# Patient Record
Sex: FEMALE | Race: WHITE
[De-identification: names, ages, dates, MRNs, and addresses within clinical notes are randomized per-mention and may not be internally consistent; named-entity substitution may affect disease eponyms.]

---

## 2017-10-24 ENCOUNTER — HOSPITAL ENCOUNTER (EMERGENCY)
Dept: HOSPITAL 17 - NEPD | Age: 37
Discharge: HOME | End: 2017-10-24
Payer: COMMERCIAL

## 2017-10-24 VITALS
OXYGEN SATURATION: 98 % | HEART RATE: 92 BPM | RESPIRATION RATE: 15 BRPM | SYSTOLIC BLOOD PRESSURE: 163 MMHG | TEMPERATURE: 98.2 F | DIASTOLIC BLOOD PRESSURE: 82 MMHG

## 2017-10-24 DIAGNOSIS — N39.0: ICD-10-CM

## 2017-10-24 DIAGNOSIS — Z79.899: ICD-10-CM

## 2017-10-24 DIAGNOSIS — B96.89: ICD-10-CM

## 2017-10-24 DIAGNOSIS — N76.0: Primary | ICD-10-CM

## 2017-10-24 DIAGNOSIS — Z88.8: ICD-10-CM

## 2017-10-24 DIAGNOSIS — Z88.6: ICD-10-CM

## 2017-10-24 LAB
BACTERIA #/AREA URNS HPF: (no result) /HPF
CHLAMYDIA PCR: NOT DETECTED
COLOR UR: (no result)
COMMENT (UR): (no result)
CULTURE IF INDICATED: (no result)
GLUCOSE UR STRIP-MCNC: (no result) MG/DL
HGB UR QL STRIP: (no result)
KETONES UR STRIP-MCNC: (no result) MG/DL
NEISSERIA PCR: NOT DETECTED
NITRITE UR QL STRIP: (no result)
SP GR UR STRIP: 1.01 (ref 1–1.03)
SQUAMOUS #/AREA URNS HPF: 6 /HPF (ref 0–5)

## 2017-10-24 PROCEDURE — 99284 EMERGENCY DEPT VISIT MOD MDM: CPT

## 2017-10-24 PROCEDURE — 96372 THER/PROPH/DIAG INJ SC/IM: CPT

## 2017-10-24 PROCEDURE — 81001 URINALYSIS AUTO W/SCOPE: CPT

## 2017-10-24 PROCEDURE — 84703 CHORIONIC GONADOTROPIN ASSAY: CPT

## 2017-10-24 PROCEDURE — 87086 URINE CULTURE/COLONY COUNT: CPT

## 2017-10-24 PROCEDURE — 87591 N.GONORRHOEAE DNA AMP PROB: CPT

## 2017-10-24 PROCEDURE — 87491 CHLMYD TRACH DNA AMP PROBE: CPT

## 2017-10-24 NOTE — PD
Physical Exam


Date Seen by Provider:  Oct 24, 2017


Time Seen by Provider:  12:12


Narrative


37-year-old  female with history of vaginal discharge and dysuria for 

the past several days.  She has lower abdominal discomfort which she describes 

as an 8 out of 10.  She denies fever or flank pain.  Last menstrual period was 

3 weeks ago.  She is allergic to aspirin, naproxen, and shellfish.





Data


Data


Last Documented VS





Vital Signs








  Date Time  Temp Pulse Resp B/P (MAP) Pulse Ox O2 Delivery O2 Flow Rate FiO2


 


10/24/17 11:57 98.2 92 15 163/82 (109) 98   











MDM


Medical Record Reviewed:  Yes


Supervised Visit with KETAN:  Yes


Narrative Course


Vitals are stable.





Urinalysis and urine pregnancy and GC chlamydia ordered.





Patient awaiting bed placement.


Condition:  Stable











Angel Morgan Oct 24, 2017 12:14

## 2017-10-24 NOTE — PD
HPI


Chief Complaint:  Gyn Problem/Complaint


Time Seen by Provider:  13:49


Travel History


International Travel<30 days:  No


Contact w/Intl Traveler<30days:  No


Traveled to known affect area:  No





History of Present Illness


HPI


36 yo F c/o vaginal discharge, white with odor. Dysuria reported. Pt reports hx 

of + chlaymdia testing and was advised to come for treatment.  Duration of vd 

approx 2-3 days. No fever/chills. No vomiting, No suresh pain. One partner of many 

years. Partner tested negative.





PFSH


Social History


Alcohol Use:  No


Tobacco Use:  No





Allergies-Medications


(Allergen,Severity, Reaction):  


Coded Allergies:  


     aspirin (Unverified  Allergy, Severe, 8/15/17)


     naproxen (Unverified  Allergy, Severe, 8/15/17)


     shellfish derived (Unverified  Allergy, Severe, 8/15/17)


Uncoded Allergies:  


     ASA, NAPROXEN (Allergy, Unknown, 9/9/03)


Reported Meds & Prescriptions





Reported Meds & Active Scripts


Active


Bactrim DS (Sulfamethoxazole-Trimethoprim) 800-160 Mg Tab 1 Tab PO BID


Metronidazole 500 Mg Tab 500 Mg PO BID 7 Days


Nitrofurantoin Macrocrystal 100 Mg Cap 100 Mg PO BID


Clindesse Vaginal Cream (Clindamycin Vaginal Cream) 2% Cream 1 Appl VAGINAL ONCE


Acyclovir 400 Mg Tab 400 Mg PO BID








Review of Systems


Except as stated in HPI:  all other systems reviewed are Neg





Physical Exam


Narrative


GENERAL: 36 yo F, pleasant, wnwd


PELVIC: Deferred per patient preference.


SKIN: Warm and dry.


HEAD: Atraumatic. Normocephalic. 


EYES: Pupils equal and round. No scleral icterus. No injection or drainage. 


ENT: No nasal bleeding or discharge.  Mucous membranes pink and moist.


NECK: Trachea midline. No JVD. 


CARDIOVASCULAR: Regular rate and rhythm.  


RESPIRATORY: No accessory muscle use. Clear to auscultation. Breath sounds 

equal bilaterally. 


GASTROINTESTINAL: Abdomen soft, non-tender, nondistended. Hepatic and splenic 

margins not palpable. 


MUSCULOSKELETAL: Extremities without clubbing, cyanosis, or edema. No obvious 

deformities. 


NEUROLOGICAL: Awake and alert. No obvious cranial nerve deficits.  Motor 

grossly within normal limits. Five out of 5 muscle strength in the arms and 

legs.  Normal speech.


PSYCHIATRIC: Appropriate mood and affect; insight and judgment normal.





Data


Data


Last Documented VS





Vital Signs








  Date Time  Temp Pulse Resp B/P (MAP) Pulse Ox O2 Delivery O2 Flow Rate FiO2


 


10/24/17 11:57 98.2 92 15 163/82 (109) 98   





VS reviewed


Orders





 Orders


Gc And Chlamydia Pcr (10/24/17 12:14)


Urinalysis - C+S If Indicated (10/24/17 12:14)


Ed Urine Pregnancytest Poc (10/24/17 12:14)


Urine Culture (10/24/17 12:30)


Azithromycin Powd Pack (Zithromax Powd P (10/24/17 14:00)


Ceftriaxone Inj (Rocephin Inj) (10/24/17 14:00)


Lidocaine 1% Inj (50 Ml) (Xylocaine 1% I (10/24/17 14:00)


Ed Discharge Order (10/24/17 14:01)





Labs





Laboratory Tests








Test


  10/24/17


12:30


 


Urine Color LIGHT-YELLOW 


 


Urine Turbidity HAZY 


 


Urine pH 6.5 


 


Urine Specific Gravity 1.013 


 


Urine Protein NEG mg/dL 


 


Urine Glucose (UA) NEG mg/dL 


 


Urine Ketones NEG mg/dL 


 


Urine Occult Blood NEG 


 


Urine Nitrite NEG 


 


Urine Bilirubin NEG 


 


Urine Urobilinogen


  LESS THAN 2.0


MG/DL


 


Urine Leukocyte Esterase LARGE 


 


Urine RBC 3 /hpf 


 


Urine WBC 26 /hpf 


 


Urine Squamous Epithelial


Cells 6 /hpf 


 


 


Urine Bacteria OCC /hpf 


 


Microscopic Urinalysis Comment


  CULTURE


INDICATED


 


Chlamydia trachomatis DNA


(PCR) NOT DETECTED 


 


 


Neisseria gonorrhoeae DNA


(PCR) NOT DETECTED 


 











MDM


Medical Decision Making


Medical Screen Exam Complete:  Yes


Emergency Medical Condition:  Yes


Medical Record Reviewed:  Yes


Differential Diagnosis


chlamydia, uti, bv


Narrative Course


poc pregnanacy is negative


pt will be treated empirically for stds and bv


ua reveals a uti





Diagnosis





 Primary Impression:  


 BV (bacterial vaginosis)


 Additional Impression:  


 UTI (urinary tract infection)


 Qualified Codes:  N30.01 - Acute cystitis with hematuria


Referrals:  


Primary Care Physician


2 days





***Additional Instructions:  


You have a choice when it comes to health care, and we are glad that you chose 

[facility]. Hopefully, we have met your expectations on today's visit.  You 

are welcome to return to [facility] at any time, as we are committed to 

meeting the health care needs of our community.


***Med/Other Pt SpecificInfo:  Prescription(s) given


Scripts


Sulfamethoxazole-Trimethoprim (Bactrim DS) 800-160 Mg Tab


1 TAB PO BID for Infection, #6 TAB 0 Refills


   Prov: Riccardo Ruiz MD         10/24/17 


Metronidazole (Metronidazole) 500 Mg Tab


500 MG PO BID for Infection for 7 Days, #14 TAB 0 Refills


   Prov: Riccardo Ruiz MD         10/24/17


Disposition:  01 DISCHARGE HOME


Condition:  Stable











Riccardo Ruiz MD Oct 24, 2017 14:00

## 2018-03-26 ENCOUNTER — HOSPITAL ENCOUNTER (OUTPATIENT)
Dept: HOSPITAL 17 - NEPE | Age: 38
Setting detail: OBSERVATION
LOS: 1 days | Discharge: HOME | End: 2018-03-27
Payer: COMMERCIAL

## 2018-03-26 VITALS — BODY MASS INDEX: 33.44 KG/M2 | HEIGHT: 63 IN | WEIGHT: 188.72 LBS

## 2018-03-26 DIAGNOSIS — F41.9: ICD-10-CM

## 2018-03-26 DIAGNOSIS — R11.0: ICD-10-CM

## 2018-03-26 DIAGNOSIS — R06.02: ICD-10-CM

## 2018-03-26 DIAGNOSIS — R07.89: Primary | ICD-10-CM

## 2018-03-26 DIAGNOSIS — L03.90: ICD-10-CM

## 2018-03-26 DIAGNOSIS — Z82.49: ICD-10-CM

## 2018-03-26 DIAGNOSIS — F17.210: ICD-10-CM

## 2018-03-26 PROCEDURE — 93005 ELECTROCARDIOGRAM TRACING: CPT

## 2018-03-26 PROCEDURE — 85730 THROMBOPLASTIN TIME PARTIAL: CPT

## 2018-03-26 PROCEDURE — 80307 DRUG TEST PRSMV CHEM ANLYZR: CPT

## 2018-03-26 PROCEDURE — 96361 HYDRATE IV INFUSION ADD-ON: CPT

## 2018-03-26 PROCEDURE — 82550 ASSAY OF CK (CPK): CPT

## 2018-03-26 PROCEDURE — 86140 C-REACTIVE PROTEIN: CPT

## 2018-03-26 PROCEDURE — 83735 ASSAY OF MAGNESIUM: CPT

## 2018-03-26 PROCEDURE — 99285 EMERGENCY DEPT VISIT HI MDM: CPT

## 2018-03-26 PROCEDURE — 80053 COMPREHEN METABOLIC PANEL: CPT

## 2018-03-26 PROCEDURE — 85025 COMPLETE CBC W/AUTO DIFF WBC: CPT

## 2018-03-26 PROCEDURE — 85610 PROTHROMBIN TIME: CPT

## 2018-03-26 PROCEDURE — 78452 HT MUSCLE IMAGE SPECT MULT: CPT

## 2018-03-26 PROCEDURE — 93017 CV STRESS TEST TRACING ONLY: CPT

## 2018-03-26 PROCEDURE — 81001 URINALYSIS AUTO W/SCOPE: CPT

## 2018-03-26 PROCEDURE — 84484 ASSAY OF TROPONIN QUANT: CPT

## 2018-03-26 PROCEDURE — G0378 HOSPITAL OBSERVATION PER HR: HCPCS

## 2018-03-26 PROCEDURE — 96374 THER/PROPH/DIAG INJ IV PUSH: CPT

## 2018-03-26 PROCEDURE — 85379 FIBRIN DEGRADATION QUANT: CPT

## 2018-03-26 PROCEDURE — A9502 TC99M TETROFOSMIN: HCPCS

## 2018-03-26 PROCEDURE — 84703 CHORIONIC GONADOTROPIN ASSAY: CPT

## 2018-03-26 PROCEDURE — 82552 ASSAY OF CPK IN BLOOD: CPT

## 2018-03-26 PROCEDURE — 85652 RBC SED RATE AUTOMATED: CPT

## 2018-03-26 PROCEDURE — 71045 X-RAY EXAM CHEST 1 VIEW: CPT

## 2018-03-27 VITALS
OXYGEN SATURATION: 98 % | DIASTOLIC BLOOD PRESSURE: 70 MMHG | SYSTOLIC BLOOD PRESSURE: 149 MMHG | TEMPERATURE: 97.8 F | HEART RATE: 85 BPM | RESPIRATION RATE: 18 BRPM

## 2018-03-27 VITALS
HEART RATE: 74 BPM | OXYGEN SATURATION: 96 % | TEMPERATURE: 98.1 F | RESPIRATION RATE: 20 BRPM | SYSTOLIC BLOOD PRESSURE: 117 MMHG | DIASTOLIC BLOOD PRESSURE: 73 MMHG

## 2018-03-27 VITALS
TEMPERATURE: 97.8 F | OXYGEN SATURATION: 99 % | HEART RATE: 76 BPM | SYSTOLIC BLOOD PRESSURE: 120 MMHG | RESPIRATION RATE: 17 BRPM | DIASTOLIC BLOOD PRESSURE: 81 MMHG

## 2018-03-27 VITALS
DIASTOLIC BLOOD PRESSURE: 73 MMHG | RESPIRATION RATE: 16 BRPM | HEART RATE: 71 BPM | SYSTOLIC BLOOD PRESSURE: 128 MMHG | OXYGEN SATURATION: 98 %

## 2018-03-27 VITALS
RESPIRATION RATE: 16 BRPM | OXYGEN SATURATION: 96 % | HEART RATE: 89 BPM | SYSTOLIC BLOOD PRESSURE: 144 MMHG | DIASTOLIC BLOOD PRESSURE: 83 MMHG

## 2018-03-27 VITALS — TEMPERATURE: 97.8 F | SYSTOLIC BLOOD PRESSURE: 134 MMHG | DIASTOLIC BLOOD PRESSURE: 75 MMHG

## 2018-03-27 VITALS
SYSTOLIC BLOOD PRESSURE: 127 MMHG | OXYGEN SATURATION: 95 % | RESPIRATION RATE: 20 BRPM | HEART RATE: 74 BPM | DIASTOLIC BLOOD PRESSURE: 70 MMHG | TEMPERATURE: 97.7 F

## 2018-03-27 VITALS
RESPIRATION RATE: 15 BRPM | SYSTOLIC BLOOD PRESSURE: 132 MMHG | HEART RATE: 77 BPM | OXYGEN SATURATION: 99 % | DIASTOLIC BLOOD PRESSURE: 70 MMHG

## 2018-03-27 VITALS
HEART RATE: 84 BPM | SYSTOLIC BLOOD PRESSURE: 144 MMHG | OXYGEN SATURATION: 100 % | DIASTOLIC BLOOD PRESSURE: 83 MMHG | RESPIRATION RATE: 18 BRPM

## 2018-03-27 VITALS
DIASTOLIC BLOOD PRESSURE: 80 MMHG | OXYGEN SATURATION: 99 % | HEART RATE: 85 BPM | SYSTOLIC BLOOD PRESSURE: 137 MMHG | TEMPERATURE: 98.3 F | RESPIRATION RATE: 20 BRPM

## 2018-03-27 VITALS
OXYGEN SATURATION: 99 % | HEART RATE: 87 BPM | DIASTOLIC BLOOD PRESSURE: 86 MMHG | RESPIRATION RATE: 16 BRPM | SYSTOLIC BLOOD PRESSURE: 149 MMHG

## 2018-03-27 VITALS — RESPIRATION RATE: 16 BRPM | OXYGEN SATURATION: 99 %

## 2018-03-27 VITALS — OXYGEN SATURATION: 99 %

## 2018-03-27 LAB
ALBUMIN SERPL-MCNC: 3.2 GM/DL (ref 3.4–5)
ALP SERPL-CCNC: 78 U/L (ref 45–117)
ALT SERPL-CCNC: 21 U/L (ref 10–53)
AST SERPL-CCNC: 30 U/L (ref 15–37)
BACTERIA #/AREA URNS HPF: (no result) /HPF
BASOPHILS # BLD AUTO: 0 TH/MM3 (ref 0–0.2)
BASOPHILS NFR BLD: 0.5 % (ref 0–2)
BILIRUB SERPL-MCNC: 0.2 MG/DL (ref 0.2–1)
BUN SERPL-MCNC: 13 MG/DL (ref 7–18)
CALCIUM SERPL-MCNC: 8.2 MG/DL (ref 8.5–10.1)
CHLORIDE SERPL-SCNC: 108 MEQ/L (ref 98–107)
COLOR UR: YELLOW
CREAT SERPL-MCNC: 0.7 MG/DL (ref 0.5–1)
CRP SERPL-MCNC: (no result) MG/DL (ref 0–0.3)
D-DIMER: 0.24 MG/L FEU (ref 0–0.5)
EOSINOPHIL # BLD: 0.4 TH/MM3 (ref 0–0.4)
EOSINOPHIL NFR BLD: 3.6 % (ref 0–4)
ERYTHROCYTE [DISTWIDTH] IN BLOOD BY AUTOMATED COUNT: 13 % (ref 11.6–17.2)
GFR SERPLBLD BASED ON 1.73 SQ M-ARVRAT: 94 ML/MIN (ref 89–?)
GLUCOSE SERPL-MCNC: 115 MG/DL (ref 74–106)
GLUCOSE UR STRIP-MCNC: (no result) MG/DL
HCO3 BLD-SCNC: 26 MEQ/L (ref 21–32)
HCT VFR BLD CALC: 37.9 % (ref 35–46)
HGB BLD-MCNC: 13.3 GM/DL (ref 11.6–15.3)
HGB UR QL STRIP: (no result)
INR PPP: 1 RATIO
KETONES UR STRIP-MCNC: (no result) MG/DL
LYMPHOCYTES # BLD AUTO: 4.9 TH/MM3 (ref 1–4.8)
LYMPHOCYTES NFR BLD AUTO: 48.1 % (ref 9–44)
MAGNESIUM SERPL-MCNC: 1.9 MG/DL (ref 1.5–2.5)
MCH RBC QN AUTO: 30.6 PG (ref 27–34)
MCHC RBC AUTO-ENTMCNC: 35.1 % (ref 32–36)
MCV RBC AUTO: 87.2 FL (ref 80–100)
MONOCYTE #: 0.6 TH/MM3 (ref 0–0.9)
MONOCYTES NFR BLD: 6.4 % (ref 0–8)
MUCOUS THREADS #/AREA URNS LPF: (no result) /LPF
NEUTROPHILS # BLD AUTO: 4.2 TH/MM3 (ref 1.8–7.7)
NEUTROPHILS NFR BLD AUTO: 41.4 % (ref 16–70)
NITRITE UR QL STRIP: (no result)
PLATELET # BLD: 273 TH/MM3 (ref 150–450)
PMV BLD AUTO: 9.1 FL (ref 7–11)
PROT SERPL-MCNC: 6.6 GM/DL (ref 6.4–8.2)
PROTHROMBIN TIME: 10.2 SEC (ref 9.8–11.6)
RBC # BLD AUTO: 4.35 MIL/MM3 (ref 4–5.3)
SODIUM SERPL-SCNC: 142 MEQ/L (ref 136–145)
SP GR UR STRIP: 1.04 (ref 1–1.03)
SQUAMOUS #/AREA URNS HPF: 17 /HPF (ref 0–5)
TROPONIN I SERPL-MCNC: (no result) NG/ML (ref 0.02–0.05)
URINE LEUKOCYTE ESTERASE: (no result)
WBC # BLD AUTO: 10.2 TH/MM3 (ref 4–11)

## 2018-03-27 NOTE — TR
Date Performed: 03/27/2018       Time Performed: 08:39:46

 

DOCTOR:      Nadine Fragoso 

 

DRUG LIST:     

CLINICAL HISTORY:      CHEST PAIN RO ACS

REASON FOR TEST:     

REASON FOR ENDING:     

OBSERVATION:     

CONCLUSION:      VICKI PROTOCOL. NO CP. TEST STOPPED AFTER EXCEEDING GOAL HR SECONDARY TO SOB AND LEG
 FATIGUE.Maximum PN=840 % Max HR Achieved=90.0% Maximum TY=224/66 Total Exercise Time=8:02

COMMENTS:      Borderline ETT with sub mm inferior ST depression

## 2018-03-27 NOTE — RADRPT
EXAM DATE/TIME:  03/27/2018 00:52 

 

[facility] COMPARISON:     

No previous studies available for comparison.

 

                     

INDICATIONS :     

Right side chest pain. 

                     

 

MEDICAL HISTORY :     

None.          

 

SURGICAL HISTORY :        

Cervical surgery. 

 

ENCOUNTER:     

Initial                                        

 

ACUITY:     

1 day      

 

PAIN SCORE:     

3/10

 

LOCATION:     

Right chest 

 

FINDINGS:     

A single view of the chest demonstrates the lungs to be symmetrically aerated without evidence of mas
s, infiltrate or effusion.  The cardiomediastinal contours are unremarkable.  Osseous structures are 
intact.

 

CONCLUSION:     No acute disease.  

 

 

 

 Paolo Kohler MD on March 27, 2018 at 1:08           

Board Certified Radiologist.

 This report was verified electronically.

## 2018-03-27 NOTE — PD
HPI


Chief Complaint:  Chest Pain


Time Seen by Provider:  00:22


Travel History


International Travel<30 days:  No


Contact w/Intl Traveler<30days:  No


Traveled to known affect area:  No





History of Present Illness


HPI


The patient is a 38 year old female who presents to the [facility] 

emergency department with a history of chest pain that began this evening at 5 

PM.  She reports that the pain is a squeezing sensation peer it has been coming 

and going.  The pain lasted for a few minutes and then recurred 30 minutes 

later and has not recurred since then. She has had shortness of breath 

associated with the pain.  She has had nausea for weeks, however without 

vomiting.. She has diarrhea x4 that began today.  She incidentally also reports 

having a cyst on her back that recurrently gets infected. It was last infected 

1 and 1/2 months ago. It is located on the right side of her upper back. It was 

lanced.  She reports that she is concerned that it may be infected again.  She 

denies having any known recent fevers, cough or congestion, neck pain, 

abdominal pain, urinary symptoms, or neurologic symptoms.  She denies having 

any history of hypertension, hyperlipidemia, or diabetes mellitus.  She denies 

having any lower extremity edema, calf pain, or erythema.  She denies having 

any prior history of DVT or PE.  She does however have a FH: Of heart disease 

in her mother who had an MI at 38 years of age.





LMP: 3/20. 


PCP: None.





Critical access hospital


Past Medical History


*** Narrative Medical


The patient's past medical history is significant for anxiety disorder, 

recurrent UTI, recurrent bacterial vaginosis and yeast vaginitis.


Medical History:  Denies Significant Hx


Blood Disorders:  No


Pregnant?:  Not Pregnant


LMP:  3/20/18





Past Surgical History


*** Narrative Surgical


The patient's past surgical history is significant for cryo and Leep for 

cervical dysplasia.


Surgical History:  No Previous Surgery





Social History


Alcohol Use:  Yes (wine cooler occasionally)


Tobacco Use:  Yes (1 pk per week. )


Substance Use:  No





Allergies-Medications


(Allergen,Severity, Reaction):  


Coded Allergies:  


     aspirin (Unverified  Allergy, Severe, 3/27/18)


     naproxen (Unverified  Allergy, Severe, 3/27/18)


     shellfish derived (Unverified  Allergy, Severe, 3/27/18)


Uncoded Allergies:  


     ASA, NAPROXEN (Allergy, Unknown, 9/9/03)


Reported Meds & Prescriptions





Reported Meds & Active Scripts


Active


No Active Prescriptions or Reported Medications    








Review of Systems


Except as stated in HPI:  all other systems reviewed are Neg


General / Constitutional:  No: Fever


Eyes:  No: Visual changes


HENT:  No: Headaches


Cardiovascular:  Positive: Chest Pain or Discomfort


Respiratory:  No: Shortness of Breath


Gastrointestinal:  Positive: Nausea, Diarrhea, Changes in Bowel Habits, No: 

Vomiting, Abdominal Pain


Genitourinary:  No: Dysuria


Musculoskeletal:  No: Pain


Skin:  No Rash


Neurologic:  No: Weakness


Psychiatric:  No: Depression


Endocrine:  No: Polydipsia


Hematologic/Lymphatic:  No: Easy Bruising





Physical Exam


Narrative


General: 


The patient is a well-developed well-nourished female in no acute distress.





Head and Neck exam: 


Head is normocephalic atraumatic. 


Eyes: EOMI, pupils are equal round and reactive to light. 


Nose: Midline septum with pink mucous membranes 


Mouth: Dentition unremarkable. Moist mucus membranes. Posterior oropharynx is 

not erythematous. No tonsillar hypertrophy. Uvula midline. Airway patent. 


Neck: No palpable lymphadenopathy. No nuchal rigidity. No thyromegaly. 





Cardiovascular: 


Regular rate and rhythm without murmurs, gallops, or rubs. No pulse deficit to 

the extremities on simultaneous auscultation and palpation of her radial 

artery. 





Lungs: 


Clear to auscultation bilaterally. No wheezes, rhonchi, or rales.


 


Abdomen:


Soft, without tenderness to palpation in all 4 quadrants of the abdomen. No 

guarding, rebound, or rigidity.  Normal bowel sounds are audible.  No 

tenderness on palpation of McBurney's point.  Negative Moran sign.





Extremities: 


No clubbing, cyanosis, or edema. 2+ pulses in all 4 extremities.  No calf 

tenderness on palpation.





Back: 


No spinous process tenderness to palpation. No costovertebral angle tenderness 

to palpation. 





Neurologic Exam: Grossly nonfocal.





Skin Exam: No rash noted. Intact skin that is warm and dry.  On examination of 

the patient's right upper back the patient is noted to have a healing wound 

where she had incision and drainage.  The patient has a palpable cyst without 

any fluctuance or surrounding erythema.





Data


Data


Last Documented VS





Vital Signs








  Date Time  Temp Pulse Resp B/P (MAP) Pulse Ox O2 Delivery O2 Flow Rate FiO2


 


3/27/18 02:30  77 15 132/70 (90) 99   


 


3/27/18 00:16      Room Air  


 


3/27/18 00:07 97.8       








Orders





 Orders


Electrocardiogram (3/27/18 00:26)


Complete Blood Count With Diff (3/27/18 00:26)


Comprehensive Metabolic Panel (3/27/18 00:26)


Creatine Kinase (Cpk) (3/27/18 00:26)


Ckmb (Isoenzyme) Profile (3/27/18 00:26)


Troponin I (3/27/18 00:26)


Prothrombin Time / Inr (Pt) (3/27/18 00:26)


Act Partial Throm Time (Ptt) (3/27/18 00:26)


C-Reactive Protein (Crp) (3/27/18 00:26)


Urinalysis - C+S If Indicated (3/27/18 00:26)


Westergren Sedimentation Rate (3/27/18 00:26)


D-Dimer (3/27/18 00:26)


Magnesium (Mg) (3/27/18 00:26)


Chest, Single Ap (3/27/18 00:26)


Iv Access Insert/Monitor (3/27/18 00:26)


Ecg Monitoring (3/27/18 00:26)


Oximetry (3/27/18 00:26)


Ed Urine Pregnancytest Poc (3/27/18 00:26)


Drug Screen, Random Urine (3/27/18 00:26)


Nitroglycerin Sl (Nitrostat Sl) (3/27/18 02:30)


Sodium Chlor 0.9% 1000 Ml Inj (Ns 1000 M (3/27/18 02:30)


Ondansetron Inj (Zofran Inj) (3/27/18 02:30)


CKMB (3/27/18 01:46)


CKMB% (3/27/18 01:46)


Admit Order (Ed Use Only) (3/27/18 03:04)





Labs





Laboratory Tests








Test


  3/27/18


00:20 3/27/18


00:50 3/27/18


01:46


 


White Blood Count 10.2 TH/MM3   


 


Red Blood Count 4.35 MIL/MM3   


 


Hemoglobin 13.3 GM/DL   


 


Hematocrit 37.9 %   


 


Mean Corpuscular Volume 87.2 FL   


 


Mean Corpuscular Hemoglobin 30.6 PG   


 


Mean Corpuscular Hemoglobin


Concent 35.1 % 


  


  


 


 


Red Cell Distribution Width 13.0 %   


 


Platelet Count 273 TH/MM3   


 


Mean Platelet Volume 9.1 FL   


 


Neutrophils (%) (Auto) 41.4 %   


 


Lymphocytes (%) (Auto) 48.1 %   


 


Monocytes (%) (Auto) 6.4 %   


 


Eosinophils (%) (Auto) 3.6 %   


 


Basophils (%) (Auto) 0.5 %   


 


Neutrophils # (Auto) 4.2 TH/MM3   


 


Lymphocytes # (Auto) 4.9 TH/MM3   


 


Monocytes # (Auto) 0.6 TH/MM3   


 


Eosinophils # (Auto) 0.4 TH/MM3   


 


Basophils # (Auto) 0.0 TH/MM3   


 


CBC Comment DIFF FINAL   


 


Differential Comment    


 


Erythrocyte Sedimentation Rate 11 mm/hr   


 


Urine Color  YELLOW  


 


Urine Turbidity  HAZY  


 


Urine pH  6.0  


 


Urine Specific Gravity  1.036  


 


Urine Protein  TRACE mg/dL  


 


Urine Glucose (UA)  NEG mg/dL  


 


Urine Ketones  NEG mg/dL  


 


Urine Occult Blood  TRACE  


 


Urine Nitrite  NEG  


 


Urine Bilirubin  NEG  


 


Urine Urobilinogen  2.0 MG/DL  


 


Urine Leukocyte Esterase  NEG  


 


Urine RBC  2 /hpf  


 


Urine WBC  1 /hpf  


 


Urine Squamous Epithelial


Cells 


  17 /hpf 


  


 


 


Urine Bacteria  OCC /hpf  


 


Urine Mucus  MOD /lpf  


 


Microscopic Urinalysis Comment


  


  CULT NOT


INDICATED 


 


 


Urine Opiates Screen  NEG  


 


Urine Barbiturates Screen  NEG  


 


Urine Amphetamines Screen  NEG  


 


Urine Benzodiazepines Screen  NEG  


 


Urine Cocaine Screen  NEG  


 


Urine Cannabinoids Screen  NEG  


 


Prothrombin Time   10.2 SEC 


 


Prothromb Time International


Ratio 


  


  1.0 RATIO 


 


 


Activated Partial


Thromboplast Time 


  


  24.0 SEC 


 


 


D-Dimer Quantitative (PE/DVT)   0.24 MG/L FEU 


 


Blood Urea Nitrogen   13 MG/DL 


 


Creatinine   0.70 MG/DL 


 


Random Glucose   115 MG/DL 


 


Total Protein   6.6 GM/DL 


 


Albumin   3.2 GM/DL 


 


Calcium Level   8.2 MG/DL 


 


Magnesium Level   1.9 MG/DL 


 


Alkaline Phosphatase   78 U/L 


 


Aspartate Amino Transf


(AST/SGOT) 


  


  30 U/L 


 


 


Alanine Aminotransferase


(ALT/SGPT) 


  


  21 U/L 


 


 


Total Bilirubin   0.2 MG/DL 


 


Sodium Level   142 MEQ/L 


 


Potassium Level   4.0 MEQ/L 


 


Chloride Level   108 MEQ/L 


 


Carbon Dioxide Level   26.0 MEQ/L 


 


Anion Gap   8 MEQ/L 


 


Estimat Glomerular Filtration


Rate 


  


  94 ML/MIN 


 


 


Total Creatine Kinase   140 U/L 


 


Creatine Kinase MB   1.8 NG/ML 


 


Troponin I


  


  


  LESS THAN 0.02


NG/ML


 


C-Reactive Protein


  


  


  LESS THAN 0.29


MG/DL











MDM


Medical Decision Making


Medical Screen Exam Complete:  Yes


Emergency Medical Condition:  Yes


Medical Record Reviewed:  Yes


Differential Diagnosis


Acute coronary syndrome, versus pulmonary embolism, versus anxiety disorder, 

versus pneumonia, versus pleurisy, versus acid reflux


Narrative Course


During the course of the patient's emergency department visit, the patient's 

history, examination, and differential diagnosis were reviewed with the 

patient. The patient was placed on a cardiac monitor with oximetry and frequent 

blood pressure monitoring. The patient had  IV access obtained and blood work 

sent for analysis.  The patient had an EKG done on arrival.  The patient's EKG 

reveals a sinus rhythm with heart rate of 94, QRS duration is 90 ms,  

ms.  No acute ST segment elevation.  T waves are inverted in V2.





The patient was initially provided nitroglycerin sublingual 1 the patient was 

given normal saline 1 L IV fluid bolus, Zofran 4 mg IV per





The patient's laboratory studies were reviewed and remarkable for a white count 

of 10.2, hemoglobin 13.3, platelets 273 with lymphocytes 48.1.  Sedimentation 

rate is 11.  CMP is remarkable for chloride of 108, glucose 115, cardiac 

enzymes within normal limits, C-reactive protein less than 0.29, PT PTT 

unremarkable, d-dimer 0.24 decreasing likelihood of pulmonary embolism in this 

patient with no other significant risk factors.  Urine drug screen is negative, 

urinalysis is unremarkable.





Radiology studies were reviewed and remarkable for a chest x-ray that shows no 

acute cardiopulmonary disease.





The patient is agreeable with plan to proceed with admission to the chest pain 

center for rule out serial cardiac enzyme protocol followed by consideration of 

stress testing under the care of the cardiologist given her family history of 

coronary artery disease and tobacco use history.





The patient's results were discussed with the patient, including the plan of 

care. I explained that further testing and/ or monitoring is indicated based on 

the patient's history, examination, and/ or laboratory findings. Therefore, I 

recommended admission for additional evaluation. The patient expressed 

understanding and was agreeable with this plan. The patient was admitted to the 

hospital in stable condition and sent to a bed under the care of the chest pain 

center.





Diagnosis





 Primary Impression:  


 Chest pain, rule out acute myocardial infarction





Admitting Information


Admitting Physician Requests:  Observation


Scripts


No Active Prescriptions or Reported Meds











Crystal Valencia MD Mar 27, 2018 00:30

## 2018-03-27 NOTE — RADRPT
EXAM DATE/TIME:  03/27/2018 12:35 

 

[facility] COMPARISON:     

No previous studies available for comparison.

 

 

INDICATIONS :      

Substernal chest pain. Angina Abnormal excercise treamill test.

                       

 

DOSE:      

27.2 mCi Tc99m Myoview at stress

                     8.6  mCi Tc99m Myoview at rest

                       

                       

 

 REST HEART RATE:     

85 BPM

 

TARGET HEART RATE:      

156 BPM

 

MAX HEART RATE:      

169 BPM

 

REST BLOOD PRESSURE:     

122/72 mmHg

 

MAX BLOOD PRESSURE:      

122/72 mmHg

 

EJECTION FRACTION:     

> 70%

                       

                       

 

MEDICAL HISTORY :        

Smoker.

 

SURGICAL HISTORY :         

Cervical dysplasia surgery.

 

ENCOUNTER:     

Initial

 

ACUITY:     

1 day

 

PAIN SCALE:     

4/10

 

LOCATION:      

Substernal chest 

 

TECHNIQUE:     

The patient underwent upright treadmill exercise in the chest pain center.  Continuous ECG tracing wa
s monitored during stress.  Gated SPECT imaging was performed after stress, and conventional SPECT im
aging was performed at rest.  The examination was performed on a SPECT/CT scanner, both attenuation-c
orrected and non-corrected datasets were reviewed.

 

FINDINGS:     

 

DISTRIBUTION:     

The maximum perfused segment at stress is in the inferior wall.

 

PERFUSION STUDY:     

The pattern of perfusion at stress is within normal limits.

 

GATED STUDY:     

There is intact wall motion and thickening without hypokinetic or dyskinetic segments. 

 

CONCLUSION:     

1. No significant reversibility to suggest ischemia.

2. Normal wall motion with ejection fraction of greater than 70%.

 

RISK CATEGORY:      Low (<1% Annual Mortality Rate)

 

 

 

 

 Mitchell Salgado MD on March 27, 2018 at 14:28           

Board Certified Radiologist.

 This report was verified electronically.

## 2018-03-27 NOTE — HHI.HP
HPI


Primary Care Physician


No Primary Care Physician


Chief Complaint


Chest pain


History of Present Illness


This is a 38-year-old female the presents to ED via private vehicle with a 

complaint of a right sided chest discomfort that began about 5:00 yesterday 

afternoon.  It lasted a few seconds however it recurred a few minutes later 

also lasting just a few seconds.  Cannot recall being short of breath, nauseous

, diaphoretic.  States that her mother had an MI at 38.  Patient denies 

personal history of CAD and cannot recall ever having a stress test.  Denies 

pregnancy.  She is also complaining of an abscessed area to her upper back.  

States that it was lanced about a month ago at [facility] was placed on 

antibiotics.  States it had gotten better but recurred.  There have been no 

drainage.  Denies fevers or chills.





Review of Systems


General: Patient denies fevers, chills, and recent travel.


HEENT: Patient denies headache, sore throat, difficulty swallowing.  


Cardiovascular: Has the chest discomfort as mentioned above.  Denies sensation 

of heart beating rapidly or irregularly.  No syncope.  Denies diaphoresis.


Respiratory: Denies shortness of breath or inspirational chest discomfort.  

Denies coughing wheezing or hemoptysis.  


GI: Patient denies nausea, vomiting, diarrhea, abdominal pain, bloody stools.


Musculoskeletal: Patient denies joint pain or edema.  Denies calf pain or edema.


Neurovascular: Patient denies numbness, tingling, weakness in extremities.  

Denies headache.


Endocrine: Denies polyuria and polydipsia.


Hematologic: Denies easy bruising.


Skin: Complains of abscess to upper back.





Past Family Social History


Allergies:  


Coded Allergies:  


     aspirin (Unverified  Allergy, Severe, 3/27/18)


     naproxen (Unverified  Allergy, Severe, 3/27/18)


     shellfish derived (Unverified  Allergy, Severe, 3/27/18)


Uncoded Allergies:  


     ASA, NAPROXEN (Allergy, Unknown, 03)


Past Medical History


Tobacco abuse.  Denies hypertension, hyperlipidemia, diabetes, and known CAD.


Past Surgical History


LEEP procedure for cervical dysplasia.


Reported Medications





Reported Meds & Active Scripts


Active


No Active Prescriptions or Reported Medications


Active Ordered Medications





Current Medications








 Medications


  (Trade)  Dose


 Ordered  Sig/Deven


 Route  Start Time


 Stop Time Status Last Admin


 


  (NS Flush)  2 ml  UNSCH  PRN


 IV FLUSH  3/27/18 04:15


     


 


 


  (NS Flush)  2 ml  BID


 IV FLUSH  3/27/18 09:00


     


 


 


  (Tylenol)  500 mg  Q4H  PRN


 PO  3/27/18 04:15


    3/27/18 05:36


 








Family History


States that her mother had an MI at age 38.


Social History


Smokes about 1 pack of cigarettes per week.  Has an occasional wine cooler.  

Denies illicit drug use.





Physical Exam


Vital Signs





Vital Signs








  Date Time  Temp Pulse Resp B/P (MAP) Pulse Ox O2 Delivery O2 Flow Rate FiO2


 


3/27/18 05:00  71 16 128/73 (91) 98   


 


3/27/18 04:18     99   


 


3/27/18 02:30  77 15 132/70 (90) 99   


 


3/27/18 00:52  87 16 149/86 (107) 99   


 


3/27/18 00:16  89 16 144/83 (103) 96 Room Air  


 


3/27/18 00:15  84 18 144/83 (103) 100 Room Air  


 


3/27/18 00:07 97.8 85 18 149/70 (96) 98   








Physical Exam


GENERAL: This is a well-nourished, well-developed patient, in no apparent 

distress.  Patient speaks in clear complete sentences.  Patient is pleasant.


HEENT: Head is atraumatic and normocephalic.  Neck is supple without 

lymphadenopathy and trachea is midline.  No JVD or carotid bruits.


CARDIOVASCULAR: Regular rate and rhythm without murmurs, gallops, or rubs. 


RESPIRATORY: Clear to auscultation. Breath sounds equal bilaterally. No wheezes

, rales, or rhonchi.  Chest wall is nontender.  No use of accessory muscles.


GASTROINTESTINAL: Abdomen is nontender, nondistended.  Abdomen soft.  No 

obvious pulsatile mass or bruit.  No CVA tenderness.  Strong femoral pulses 

bilaterally.  Normal bowel sounds in all quadrants.


MUSCULOSKELETAL: Patient is moving upper and lower extremities freely.  No calf 

tenderness or edema, no Homans sign.  Strong pulses in upper and lower 

extremities.


NEUROLOGICAL: Patient is alert and oriented.  Cranial nerves 2-12 are grossly 

intact.  No focal deficits and speech is clear.


SKIN: There is an indurated area with erythema on her upper back just to the 

right of midline.  There is an opening in the center of it from where there was 

a prior I&D.  No active drainage.  No fluctuance.


Laboratory





Laboratory Tests








Test


  3/27/18


00:20 3/27/18


00:50 3/27/18


01:46 3/27/18


05:04


 


White Blood Count 10.2    


 


Red Blood Count 4.35    


 


Hemoglobin 13.3    


 


Hematocrit 37.9    


 


Mean Corpuscular Volume 87.2    


 


Mean Corpuscular Hemoglobin 30.6    


 


Mean Corpuscular Hemoglobin


Concent 35.1 


  


  


  


 


 


Red Cell Distribution Width 13.0    


 


Platelet Count 273    


 


Mean Platelet Volume 9.1    


 


Neutrophils (%) (Auto) 41.4    


 


Lymphocytes (%) (Auto) 48.1    


 


Monocytes (%) (Auto) 6.4    


 


Eosinophils (%) (Auto) 3.6    


 


Basophils (%) (Auto) 0.5    


 


Neutrophils # (Auto) 4.2    


 


Lymphocytes # (Auto) 4.9    


 


Monocytes # (Auto) 0.6    


 


Eosinophils # (Auto) 0.4    


 


Basophils # (Auto) 0.0    


 


CBC Comment DIFF FINAL    


 


Differential Comment     


 


Erythrocyte Sedimentation Rate 11    


 


Urine Color  YELLOW   


 


Urine Turbidity  HAZY   


 


Urine pH  6.0   


 


Urine Specific Gravity  1.036   


 


Urine Protein  TRACE   


 


Urine Glucose (UA)  NEG   


 


Urine Ketones  NEG   


 


Urine Occult Blood  TRACE   


 


Urine Nitrite  NEG   


 


Urine Bilirubin  NEG   


 


Urine Urobilinogen  2.0   


 


Urine Leukocyte Esterase  NEG   


 


Urine RBC  2   


 


Urine WBC  1   


 


Urine Squamous Epithelial


Cells 


  17 


  


  


 


 


Urine Bacteria  OCC   


 


Urine Mucus  MOD   


 


Microscopic Urinalysis Comment


  


  CULT NOT


INDICATED 


  


 


 


Urine Opiates Screen  NEG   


 


Urine Barbiturates Screen  NEG   


 


Urine Amphetamines Screen  NEG   


 


Urine Benzodiazepines Screen  NEG   


 


Urine Cocaine Screen  NEG   


 


Urine Cannabinoids Screen  NEG   


 


Prothrombin Time   10.2  


 


Prothromb Time International


Ratio 


  


  1.0 


  


 


 


Activated Partial


Thromboplast Time 


  


  24.0 


  


 


 


D-Dimer Quantitative (PE/DVT)   0.24  


 


Blood Urea Nitrogen   13  


 


Creatinine   0.70  


 


Random Glucose   115  


 


Total Protein   6.6  


 


Albumin   3.2  


 


Calcium Level   8.2  


 


Magnesium Level   1.9  


 


Alkaline Phosphatase   78  


 


Aspartate Amino Transf


(AST/SGOT) 


  


  30 


  


 


 


Alanine Aminotransferase


(ALT/SGPT) 


  


  21 


  


 


 


Total Bilirubin   0.2  


 


Sodium Level   142  


 


Potassium Level   4.0  


 


Chloride Level   108  


 


Carbon Dioxide Level   26.0  


 


Anion Gap   8  


 


Estimat Glomerular Filtration


Rate 


  


  94 


  


 


 


Total Creatine Kinase   140  112 


 


Creatine Kinase MB   1.8  1.8 


 


Troponin I   LESS THAN 0.02  LESS THAN 0.02 


 


C-Reactive Protein   LESS THAN 0.29  








Result Diagram:  


3/27/18 0020                                                                   

             3/27/18 0146





Imaging


Chest x-ray reveals nothing acute.


Course


EKGs are sinus rhythm without significant ST segment depressions or elevations.





Caprini VTE Risk Assessment


Caprini VTE Risk Assessment:  No/Low Risk (score <= 1)


Caprini Risk Assessment Model











 Point Value = 1          Point Value = 2  Point Value = 3  Point Value = 5


 


Age 41-60


Minor surgery


BMI > 25 kg/m2


Swollen legs


Varicose veins


Pregnancy or postpartum


History of unexplained or recurrent


   spontaneous 


Oral contraceptives or hormone


   replacement


Sepsis (< 1 month)


Serious lung disease, including


   pneumonia (< 1 month)


Abnormal pulmonary function


Acute myocardial infarction


Congestive heart failure (< 1 month)


History of inflammatory bowel disease


Medical patient at bed rest Age 61-74


Arthroscopic surgery


Major open surgery (> 45 min)


Laparoscopic surgery (> 45 min)


Malignancy


Confined to bed (> 72 hours)


Immobilizing plaster cast


Central venous access Age >= 75


History of VTE


Family history of VTE


Factor V Leiden


Prothrombin 50178W


Lupus anticoagulant


Anticardiolipin antibodies


Elevated serum homocysteine


Heparin-induced thrombocytopenia


Other congenital or acquired


   thrombophilia Stroke (< 1 month)


Elective arthroplasty


Hip, pelvis, or leg fracture


Acute spinal cord injury (< 1 month)








Prophylaxis Regimen











   Total Risk


Factor Score Risk Level Prophylaxis Regimen


 


0-1      Low Early ambulation


 


2 Moderate Order ONE of the following:


*Sequential Compression Device (SCD)


*Heparin 5000 units SQ BID


 


3-4 Higher Order ONE of the following medications:


*Heparin 5000 units SQ TID


*Enoxaparin/Lovenox 40 mg SQ daily (WT < 150 kg, CrCl > 30 mL/min)


*Enoxaparin/Lovenox 30 mg SQ daily (WT < 150 kg, CrCl > 10-29 mL/min)


*Enoxaparin/Lovenox 30 mg SQ BID (WT < 150 kg, CrCl > 30 mL/min)


AND/OR


*Sequential Compression Device (SCD)


 


5 or more Highest Order ONE of the following medications:


*Heparin 5000 units SQ TID (Preferred with Epidurals)


*Enoxaparin/Lovenox 40 mg SQ daily (WT < 150 kg, CrCl > 30 mL/min)


*Enoxaparin/Lovenox 30 mg SQ daily (WT < 150 kg, CrCl > 10-29 mL/min)


*Enoxaparin/Lovenox 30 mg SQ BID (WT < 150 kg, CrCl > 30 mL/min)


AND


*Sequential Compression Device (SCD)











Assessment and Plan


Assessment and Plan


* Chest pain: Her symptoms are atypical.  She has had serial cardiac enzymes 

and EKGs for ruling out purposes.  She was seen by Dr. Fragoso of 

cardiology in the chest pain center and at this time will have a stress test.  

She will be discharged home if her stress test is nonischemic with instructions 

to follow-up with PCP.  Return to ED for interval issues.


* Cellulitis: Patient will be prescribed doxycycline.  Use moist heat.  Follow-

up with PCP.


* Tobacco abuse: Patient has been counseled on the importance of smoking 

cessation.


Patient is stable at this time.  She is agreeable to this plan.











Jordan Bear Mar 27, 2018 08:46

## 2018-03-27 NOTE — TR
Date Performed: 03/27/2018       Time Performed: 13:10:02

 

DOCTOR:      Nadine Fragoso 

 

DRUG LIST:     

CLINICAL HISTORY:      CP R/O ACS

REASON FOR TEST:     

REASON FOR ENDING:     

OBSERVATION:     

CONCLUSION:      VICKI PROTOCOL NICLEAR ETT. NO CP. MILD SOB.Maximum GA=969 % Max HR Achieved=92.0% R
esting YP=423/72 Total Exercise Time=7:01

COMMENTS:      Abnormal ST change suggestive of ischemia

## 2018-03-27 NOTE — EKG
Date Performed: 03/27/2018       Time Performed: 00:17:35

 

PTAGE:      38 years

 

EKG:      Sinus rhythm 

 

 NORMAL ECG Since 

 

 PREVIOUS TRACING            , no significant change noted

 

DOCTOR:   Nadine Fragoso  Interpretating Date/Time  03/27/2018 16:55:32

## 2023-08-07 NOTE — EKG
Date Performed: 2018       Time Performed: 05:02:14

 

PTAGE:      38 years

 

EKG:      Sinus rhythm 

 

 NORMAL ECG Since 

 

 PREVIOUS TRACING            , no significant change noted PREVIOUS TRACIN1998 16.20

 

DOCTOR:   Nadine Fragoso  Interpretating Date/Time  2018 16:59:39 x2